# Patient Record
Sex: MALE | Race: WHITE | NOT HISPANIC OR LATINO
[De-identification: names, ages, dates, MRNs, and addresses within clinical notes are randomized per-mention and may not be internally consistent; named-entity substitution may affect disease eponyms.]

---

## 2017-11-03 ENCOUNTER — TRANSCRIPTION ENCOUNTER (OUTPATIENT)
Age: 77
End: 2017-11-03

## 2019-06-27 ENCOUNTER — EMERGENCY (EMERGENCY)
Facility: HOSPITAL | Age: 79
LOS: 0 days | Discharge: HOME | End: 2019-06-28
Attending: EMERGENCY MEDICINE | Admitting: EMERGENCY MEDICINE
Payer: MEDICARE

## 2019-06-27 VITALS
SYSTOLIC BLOOD PRESSURE: 158 MMHG | WEIGHT: 169.98 LBS | TEMPERATURE: 97 F | HEIGHT: 71 IN | HEART RATE: 73 BPM | RESPIRATION RATE: 18 BRPM | DIASTOLIC BLOOD PRESSURE: 84 MMHG | OXYGEN SATURATION: 100 %

## 2019-06-27 DIAGNOSIS — Z85.46 PERSONAL HISTORY OF MALIGNANT NEOPLASM OF PROSTATE: ICD-10-CM

## 2019-06-27 DIAGNOSIS — N20.1 CALCULUS OF URETER: ICD-10-CM

## 2019-06-27 DIAGNOSIS — N39.0 URINARY TRACT INFECTION, SITE NOT SPECIFIED: ICD-10-CM

## 2019-06-27 DIAGNOSIS — R10.9 UNSPECIFIED ABDOMINAL PAIN: ICD-10-CM

## 2019-06-27 DIAGNOSIS — E78.5 HYPERLIPIDEMIA, UNSPECIFIED: ICD-10-CM

## 2019-06-27 LAB
ALBUMIN SERPL ELPH-MCNC: 3.7 G/DL — SIGNIFICANT CHANGE UP (ref 3.3–5)
ALP SERPL-CCNC: 57 U/L — SIGNIFICANT CHANGE UP (ref 40–120)
ALT FLD-CCNC: 17 U/L — SIGNIFICANT CHANGE UP (ref 12–78)
ANION GAP SERPL CALC-SCNC: 6 MMOL/L — SIGNIFICANT CHANGE UP (ref 5–17)
APPEARANCE UR: CLEAR — SIGNIFICANT CHANGE UP
AST SERPL-CCNC: 16 U/L — SIGNIFICANT CHANGE UP (ref 15–37)
BACTERIA # UR AUTO: ABNORMAL
BASOPHILS # BLD AUTO: 0.02 K/UL — SIGNIFICANT CHANGE UP (ref 0–0.2)
BASOPHILS NFR BLD AUTO: 0.4 % — SIGNIFICANT CHANGE UP (ref 0–2)
BILIRUB SERPL-MCNC: 0.9 MG/DL — SIGNIFICANT CHANGE UP (ref 0.2–1.2)
BILIRUB UR-MCNC: NEGATIVE — SIGNIFICANT CHANGE UP
BUN SERPL-MCNC: 21 MG/DL — SIGNIFICANT CHANGE UP (ref 7–23)
CALCIUM SERPL-MCNC: 8.9 MG/DL — SIGNIFICANT CHANGE UP (ref 8.5–10.1)
CHLORIDE SERPL-SCNC: 101 MMOL/L — SIGNIFICANT CHANGE UP (ref 96–108)
CO2 SERPL-SCNC: 27 MMOL/L — SIGNIFICANT CHANGE UP (ref 22–31)
COLOR SPEC: YELLOW — SIGNIFICANT CHANGE UP
CREAT SERPL-MCNC: 1.06 MG/DL — SIGNIFICANT CHANGE UP (ref 0.5–1.3)
DIFF PNL FLD: ABNORMAL
EOSINOPHIL # BLD AUTO: 0.03 K/UL — SIGNIFICANT CHANGE UP (ref 0–0.5)
EOSINOPHIL NFR BLD AUTO: 0.5 % — SIGNIFICANT CHANGE UP (ref 0–6)
EPI CELLS # UR: SIGNIFICANT CHANGE UP
GLUCOSE SERPL-MCNC: 119 MG/DL — HIGH (ref 70–99)
GLUCOSE UR QL: NEGATIVE MG/DL — SIGNIFICANT CHANGE UP
HCT VFR BLD CALC: 33.3 % — LOW (ref 39–50)
HGB BLD-MCNC: 11.3 G/DL — LOW (ref 13–17)
IMM GRANULOCYTES NFR BLD AUTO: 0.4 % — SIGNIFICANT CHANGE UP (ref 0–1.5)
KETONES UR-MCNC: ABNORMAL
LEUKOCYTE ESTERASE UR-ACNC: NEGATIVE — SIGNIFICANT CHANGE UP
LIDOCAIN IGE QN: 168 U/L — SIGNIFICANT CHANGE UP (ref 73–393)
LYMPHOCYTES # BLD AUTO: 0.89 K/UL — LOW (ref 1–3.3)
LYMPHOCYTES # BLD AUTO: 16 % — SIGNIFICANT CHANGE UP (ref 13–44)
MCHC RBC-ENTMCNC: 32.3 PG — SIGNIFICANT CHANGE UP (ref 27–34)
MCHC RBC-ENTMCNC: 33.9 GM/DL — SIGNIFICANT CHANGE UP (ref 32–36)
MCV RBC AUTO: 95.1 FL — SIGNIFICANT CHANGE UP (ref 80–100)
MONOCYTES # BLD AUTO: 0.39 K/UL — SIGNIFICANT CHANGE UP (ref 0–0.9)
MONOCYTES NFR BLD AUTO: 7 % — SIGNIFICANT CHANGE UP (ref 2–14)
NEUTROPHILS # BLD AUTO: 4.22 K/UL — SIGNIFICANT CHANGE UP (ref 1.8–7.4)
NEUTROPHILS NFR BLD AUTO: 75.7 % — SIGNIFICANT CHANGE UP (ref 43–77)
NITRITE UR-MCNC: NEGATIVE — SIGNIFICANT CHANGE UP
PH UR: 8 — SIGNIFICANT CHANGE UP (ref 5–8)
PLATELET # BLD AUTO: 150 K/UL — SIGNIFICANT CHANGE UP (ref 150–400)
POTASSIUM SERPL-MCNC: 3.7 MMOL/L — SIGNIFICANT CHANGE UP (ref 3.5–5.3)
POTASSIUM SERPL-SCNC: 3.7 MMOL/L — SIGNIFICANT CHANGE UP (ref 3.5–5.3)
PROT SERPL-MCNC: 7.2 GM/DL — SIGNIFICANT CHANGE UP (ref 6–8.3)
PROT UR-MCNC: 15 MG/DL
RBC # BLD: 3.5 M/UL — LOW (ref 4.2–5.8)
RBC # FLD: 12.8 % — SIGNIFICANT CHANGE UP (ref 10.3–14.5)
RBC CASTS # UR COMP ASSIST: ABNORMAL /HPF (ref 0–4)
SODIUM SERPL-SCNC: 134 MMOL/L — LOW (ref 135–145)
SP GR SPEC: 1.01 — SIGNIFICANT CHANGE UP (ref 1.01–1.02)
UROBILINOGEN FLD QL: NEGATIVE MG/DL — SIGNIFICANT CHANGE UP
WBC # BLD: 5.57 K/UL — SIGNIFICANT CHANGE UP (ref 3.8–10.5)
WBC # FLD AUTO: 5.57 K/UL — SIGNIFICANT CHANGE UP (ref 3.8–10.5)
WBC UR QL: ABNORMAL

## 2019-06-27 PROCEDURE — 99284 EMERGENCY DEPT VISIT MOD MDM: CPT

## 2019-06-27 PROCEDURE — 74176 CT ABD & PELVIS W/O CONTRAST: CPT | Mod: 26

## 2019-06-27 RX ORDER — SODIUM CHLORIDE 9 MG/ML
1000 INJECTION INTRAMUSCULAR; INTRAVENOUS; SUBCUTANEOUS ONCE
Refills: 0 | Status: COMPLETED | OUTPATIENT
Start: 2019-06-27 | End: 2019-06-27

## 2019-06-27 RX ORDER — ONDANSETRON 8 MG/1
4 TABLET, FILM COATED ORAL ONCE
Refills: 0 | Status: COMPLETED | OUTPATIENT
Start: 2019-06-27 | End: 2019-06-27

## 2019-06-27 RX ORDER — KETOROLAC TROMETHAMINE 30 MG/ML
30 SYRINGE (ML) INJECTION ONCE
Refills: 0 | Status: DISCONTINUED | OUTPATIENT
Start: 2019-06-27 | End: 2019-06-27

## 2019-06-27 RX ADMIN — Medication 30 MILLIGRAM(S): at 23:40

## 2019-06-27 RX ADMIN — SODIUM CHLORIDE 1000 MILLILITER(S): 9 INJECTION INTRAMUSCULAR; INTRAVENOUS; SUBCUTANEOUS at 23:09

## 2019-06-27 RX ADMIN — Medication 30 MILLIGRAM(S): at 23:09

## 2019-06-27 RX ADMIN — ONDANSETRON 4 MILLIGRAM(S): 8 TABLET, FILM COATED ORAL at 23:09

## 2019-06-27 NOTE — ED PROVIDER NOTE - PROGRESS NOTE DETAILS
ED evaluation and management discussed with the patient and family (if available) in detail.  Close PMD follow up encouraged.  Strict ED return instructions discussed in detail and patient given the opportunity to ask any questions about their discharge diagnosis and instructions. Patient verbalized understanding. pt feels well. no complaints. wife at bedside. uro  at [place]. MD JOE

## 2019-06-27 NOTE — ED PROVIDER NOTE - OBJECTIVE STATEMENT
78 yo male BIBA with PMHx HLD , prostate cancer 19 years ago, with c/o right flank pain since about 5:30 pm. radiating around to groin. no hx of similar. when pain comes he gets sweaty, lightheaded and nauseous. was an 8/10. now a 3/10. has not urinated for a few hours. no diarrhea. no fever. no blood in urine. pmd: at [place]

## 2019-06-27 NOTE — ED PROVIDER NOTE - NSFOLLOWUPINSTRUCTIONS_ED_ALL_ED_FT
Kidney Stones    WHAT YOU NEED TO KNOW:    Kidney stones form in the urinary system when the water and waste in your urine are out of balance. When this happens, certain types of waste crystals separate from the urine. The crystals build up and form kidney stones. You may have more than one kidney stone.     DISCHARGE INSTRUCTIONS:    Return to the emergency department if:     You have vomiting that is not relieved by medicine.        Contact your healthcare provider if:     You have a fever.       You have trouble passing urine.      You see blood in your urine.      You have severe pain.      You have any questions or concerns about your condition or care.    Medicines:     NSAIDs, such as ibuprofen, help decrease swelling, pain, and fever. This medicine is available with or without a doctor's order. NSAIDs can cause stomach bleeding or kidney problems in certain people. If you take blood thinner medicine, always ask your healthcare provider if NSAIDs are safe for you. Always read the medicine label and follow directions.      Prescription pain medicine may be given. Ask your healthcare provider how to take this medicine safely. Some prescription pain medicines contain acetaminophen. Do not take other medicines that contain acetaminophen without talking to your healthcare provider. Too much acetaminophen may cause liver damage. Prescription pain medicine may cause constipation. Ask your healthcare provider how to prevent or treat constipation.       Medicines to balance your electrolytes may be needed.       Take your medicine as directed. Contact your healthcare provider if you think your medicine is not helping or if you have side effects. Tell him or her if you are allergic to any medicine. Keep a list of the medicines, vitamins, and herbs you take. Include the amounts, and when and why you take them. Bring the list or the pill bottles to follow-up visits. Carry your medicine list with you in case of an emergency.    Follow up with your healthcare provider as directed: You may need to return for more tests. Write down your questions so you remember to ask them during your visits.    What you can do to manage kidney stones:     Drink more liquids. Your healthcare provider may tell you to drink at least 8 to 12 (eight-ounce) cups of liquids each day. This helps flush out the kidney stones when you urinate. Water is the best liquid to drink.      Strain your urine every time you go to the bathroom. Urinate through a strainer or a piece of thin cloth to catch the stones. Take the stones to your healthcare provider so they can be sent to the lab for tests. This will help your healthcare providers plan the best treatment for you.Look for Stones in the Filter     Call  to arrange follow- up.       Eat a variety of healthy foods. Healthy foods include fruits, vegetables, whole-grain breads, low-fat dairy products, beans, and fish. You may need to limit how much sodium (salt) or protein you eat. Ask for information about the best foods for you.      Stay active. Your stones may pass more easily if you stay active. Exercise can also help you manage your weight. Ask about the best activities for you.    After you pass the kidney stones: Your healthcare provider may order a 24-hour urine test. Results from a 24-hour urine test will help your healthcare provider plan ways to prevent more stones from forming. Your healthcare provider will give you more instructions.

## 2019-06-27 NOTE — ED ADULT TRIAGE NOTE - CHIEF COMPLAINT QUOTE
Sudden onset of right flank pain started tonight. (+) nausea, diaphoretic. denies vomiting, urinary sx, fever/chills.

## 2019-06-28 VITALS
OXYGEN SATURATION: 100 % | SYSTOLIC BLOOD PRESSURE: 148 MMHG | HEART RATE: 75 BPM | RESPIRATION RATE: 17 BRPM | DIASTOLIC BLOOD PRESSURE: 81 MMHG | TEMPERATURE: 98 F

## 2019-06-28 RX ORDER — CEFTRIAXONE 500 MG/1
1000 INJECTION, POWDER, FOR SOLUTION INTRAMUSCULAR; INTRAVENOUS ONCE
Refills: 0 | Status: COMPLETED | OUTPATIENT
Start: 2019-06-28 | End: 2019-06-28

## 2019-06-28 RX ORDER — CEPHALEXIN 500 MG
1 CAPSULE ORAL
Qty: 28 | Refills: 0
Start: 2019-06-28 | End: 2019-07-04

## 2019-06-28 RX ORDER — TAMSULOSIN HYDROCHLORIDE 0.4 MG/1
1 CAPSULE ORAL
Qty: 6 | Refills: 0
Start: 2019-06-28 | End: 2019-07-03

## 2019-06-28 RX ORDER — TAMSULOSIN HYDROCHLORIDE 0.4 MG/1
0.4 CAPSULE ORAL AT BEDTIME
Refills: 0 | Status: DISCONTINUED | OUTPATIENT
Start: 2019-06-28 | End: 2019-06-28

## 2019-06-28 RX ADMIN — TAMSULOSIN HYDROCHLORIDE 0.4 MILLIGRAM(S): 0.4 CAPSULE ORAL at 00:52

## 2019-06-28 RX ADMIN — CEFTRIAXONE 1000 MILLIGRAM(S): 500 INJECTION, POWDER, FOR SOLUTION INTRAMUSCULAR; INTRAVENOUS at 00:34

## 2019-06-28 RX ADMIN — SODIUM CHLORIDE 1000 MILLILITER(S): 9 INJECTION INTRAMUSCULAR; INTRAVENOUS; SUBCUTANEOUS at 00:05

## 2019-06-28 NOTE — ED ADULT NURSE NOTE - NSIMPLEMENTINTERV_GEN_ALL_ED
Implemented All Universal Safety Interventions:  [place] to call system. Call bell, personal items and telephone within reach. Instruct patient to call for assistance. Room bathroom lighting operational. Non-slip footwear when patient is off stretcher. Physically safe environment: no spills, clutter or unnecessary equipment. Stretcher in lowest position, wheels locked, appropriate side rails in place.

## 2019-06-29 LAB
CULTURE RESULTS: SIGNIFICANT CHANGE UP
SPECIMEN SOURCE: SIGNIFICANT CHANGE UP

## 2020-12-23 ENCOUNTER — APPOINTMENT (OUTPATIENT)
Dept: RADIOLOGY | Facility: CLINIC | Age: 80
End: 2020-12-23
Payer: MEDICARE

## 2020-12-23 ENCOUNTER — OUTPATIENT (OUTPATIENT)
Dept: OUTPATIENT SERVICES | Facility: HOSPITAL | Age: 80
LOS: 1 days | End: 2020-12-23
Payer: MEDICARE

## 2020-12-23 DIAGNOSIS — Z00.8 ENCOUNTER FOR OTHER GENERAL EXAMINATION: ICD-10-CM

## 2020-12-23 PROCEDURE — 73650 X-RAY EXAM OF HEEL: CPT

## 2020-12-23 PROCEDURE — 73650 X-RAY EXAM OF HEEL: CPT | Mod: 26,RT

## 2022-09-15 NOTE — ED ADULT NURSE NOTE - NSFALLRSKPASTHIST_ED_ALL_ED
Pt c/o difficulty breathing, bilateral ear pain and sore throat starting 2 days ago.  Denies PMH.   no

## 2024-02-09 ENCOUNTER — OFFICE (OUTPATIENT)
Dept: URBAN - METROPOLITAN AREA CLINIC 12 | Facility: CLINIC | Age: 84
Setting detail: OPHTHALMOLOGY
End: 2024-02-09
Payer: MEDICARE

## 2024-02-09 VITALS — HEIGHT: 60 IN

## 2024-02-09 DIAGNOSIS — H43.393: ICD-10-CM

## 2024-02-09 DIAGNOSIS — H26.493: ICD-10-CM

## 2024-02-09 DIAGNOSIS — Z96.1: ICD-10-CM

## 2024-02-09 DIAGNOSIS — H35.3131: ICD-10-CM

## 2024-02-09 PROCEDURE — 92134 CPTRZ OPH DX IMG PST SGM RTA: CPT | Performed by: OPHTHALMOLOGY

## 2024-02-09 PROCEDURE — 99204 OFFICE O/P NEW MOD 45 MIN: CPT | Performed by: OPHTHALMOLOGY

## 2024-02-09 ASSESSMENT — REFRACTION_MANIFEST
OS_AXIS: 090
OS_CYLINDER: -0.50
OD_VA1: 20/40-2
OD_SPHERE: +2.50
OD_CYLINDER: -1.00
OD_AXIS: 105
OS_VA1: 20/20-2
OS_SPHERE: +3.00

## 2024-02-09 ASSESSMENT — SPHEQUIV_DERIVED
OD_SPHEQUIV: 2
OS_SPHEQUIV: 2.75
OS_SPHEQUIV: -0.125
OD_SPHEQUIV: 0.375

## 2024-02-09 ASSESSMENT — REFRACTION_AUTOREFRACTION
OD_CYLINDER: -0.25
OD_AXIS: 073
OS_CYLINDER: -0.75
OS_AXIS: 084
OS_SPHERE: +0.25
OD_SPHERE: +0.50

## 2024-02-09 ASSESSMENT — REFRACTION_CURRENTRX
OS_OVR_VA: 20/
OD_VPRISM_DIRECTION: SV
OD_ADD: +1.50
OS_VPRISM_DIRECTION: SV
OD_OVR_VA: 20/
OS_ADD: +1.50

## 2024-02-09 ASSESSMENT — CONFRONTATIONAL VISUAL FIELD TEST (CVF)
OS_FINDINGS: FULL
OD_FINDINGS: FULL

## 2024-03-18 ENCOUNTER — RX ONLY (RX ONLY)
Age: 84
End: 2024-03-18

## 2024-03-18 ENCOUNTER — ASC (OUTPATIENT)
Dept: URBAN - METROPOLITAN AREA SURGERY 8 | Facility: SURGERY | Age: 84
Setting detail: OPHTHALMOLOGY
End: 2024-03-18
Payer: MEDICARE

## 2024-03-18 DIAGNOSIS — H26.492: ICD-10-CM

## 2024-03-18 PROCEDURE — 66821 AFTER CATARACT LASER SURGERY: CPT | Mod: LT | Performed by: OPHTHALMOLOGY

## 2024-03-18 ASSESSMENT — REFRACTION_MANIFEST
OS_VA1: 20/20-2
OD_VA1: 20/40-2
OD_AXIS: 105
OS_CYLINDER: -0.50
OD_SPHERE: +2.50
OD_CYLINDER: -1.00
OS_AXIS: 090
OS_SPHERE: +3.00

## 2024-03-18 ASSESSMENT — REFRACTION_CURRENTRX
OD_VPRISM_DIRECTION: SV
OS_OVR_VA: 20/
OS_VPRISM_DIRECTION: SV
OD_OVR_VA: 20/
OD_ADD: +1.50
OS_ADD: +1.50

## 2024-03-18 ASSESSMENT — SPHEQUIV_DERIVED
OS_SPHEQUIV: 2.75
OD_SPHEQUIV: 2

## 2024-04-15 ENCOUNTER — RX ONLY (RX ONLY)
Age: 84
End: 2024-04-15

## 2024-04-15 ENCOUNTER — ASC (OUTPATIENT)
Dept: URBAN - METROPOLITAN AREA SURGERY 8 | Facility: SURGERY | Age: 84
Setting detail: OPHTHALMOLOGY
End: 2024-04-15
Payer: MEDICARE

## 2024-04-15 DIAGNOSIS — H26.491: ICD-10-CM

## 2024-04-15 PROCEDURE — 66821 AFTER CATARACT LASER SURGERY: CPT | Mod: 79,RT | Performed by: OPHTHALMOLOGY

## 2024-05-07 ENCOUNTER — OFFICE (OUTPATIENT)
Dept: URBAN - METROPOLITAN AREA CLINIC 88 | Facility: CLINIC | Age: 84
Setting detail: OPHTHALMOLOGY
End: 2024-05-07
Payer: MEDICARE

## 2024-05-07 DIAGNOSIS — H43.393: ICD-10-CM

## 2024-05-07 DIAGNOSIS — H35.3111: ICD-10-CM

## 2024-05-07 DIAGNOSIS — H35.3122: ICD-10-CM

## 2024-05-07 PROCEDURE — 92134 CPTRZ OPH DX IMG PST SGM RTA: CPT | Performed by: OPHTHALMOLOGY

## 2024-05-07 PROCEDURE — 92014 COMPRE OPH EXAM EST PT 1/>: CPT | Mod: 24 | Performed by: OPHTHALMOLOGY

## 2024-05-07 PROCEDURE — 92202 OPSCPY EXTND ON/MAC DRAW: CPT | Performed by: OPHTHALMOLOGY

## 2024-05-07 ASSESSMENT — CONFRONTATIONAL VISUAL FIELD TEST (CVF)
OD_FINDINGS: FULL
OS_FINDINGS: FULL